# Patient Record
Sex: FEMALE | Race: BLACK OR AFRICAN AMERICAN
[De-identification: names, ages, dates, MRNs, and addresses within clinical notes are randomized per-mention and may not be internally consistent; named-entity substitution may affect disease eponyms.]

---

## 2019-07-12 ENCOUNTER — HOSPITAL ENCOUNTER (EMERGENCY)
Dept: HOSPITAL 25 - ED | Age: 29
Discharge: HOME | End: 2019-07-12
Payer: COMMERCIAL

## 2019-07-12 VITALS — DIASTOLIC BLOOD PRESSURE: 88 MMHG | SYSTOLIC BLOOD PRESSURE: 121 MMHG

## 2019-07-12 DIAGNOSIS — I10: ICD-10-CM

## 2019-07-12 DIAGNOSIS — Z83.3: ICD-10-CM

## 2019-07-12 DIAGNOSIS — E11.9: Primary | ICD-10-CM

## 2019-07-12 LAB
ALBUMIN SERPL BCG-MCNC: 4.7 G/DL (ref 3.2–5.2)
ALBUMIN/GLOB SERPL: 1.2 {RATIO} (ref 1–3)
ALP SERPL-CCNC: 89 U/L (ref 34–104)
ALT SERPL W P-5'-P-CCNC: 14 U/L (ref 7–52)
ANION GAP SERPL CALC-SCNC: 10 MMOL/L (ref 2–11)
AST SERPL-CCNC: 10 U/L (ref 13–39)
BASOPHILS # BLD AUTO: 0.1 10^3/UL (ref 0–0.2)
BUN SERPL-MCNC: 23 MG/DL (ref 6–24)
BUN/CREAT SERPL: 25 (ref 8–20)
CALCIUM SERPL-MCNC: 10.6 MG/DL (ref 8.6–10.3)
CHLORIDE SERPL-SCNC: 96 MMOL/L (ref 101–111)
CK SERPL-CCNC: 53 U/L (ref 10–223)
EOSINOPHIL # BLD AUTO: 0.1 10^3/UL (ref 0–0.6)
GLOBULIN SER CALC-MCNC: 3.9 G/DL (ref 2–4)
GLUCOSE SERPL-MCNC: 433 MG/DL (ref 70–100)
HCO3 SERPL-SCNC: 26 MMOL/L (ref 22–32)
HCT VFR BLD AUTO: 44 % (ref 35–47)
HGB BLD-MCNC: 14.7 G/DL (ref 12–16)
LYMPHOCYTES # BLD AUTO: 2 10^3/UL (ref 1–4.8)
MAGNESIUM SERPL-MCNC: 1.9 MG/DL (ref 1.9–2.7)
MCH RBC QN AUTO: 29 PG (ref 27–31)
MCHC RBC AUTO-ENTMCNC: 34 G/DL (ref 31–36)
MCV RBC AUTO: 87 FL (ref 80–97)
MONOCYTES # BLD AUTO: 0.5 10^3/UL (ref 0–0.8)
NEUTROPHILS # BLD AUTO: 5.2 10^3/UL (ref 1.5–7.7)
NRBC # BLD AUTO: 0 10^3/UL
NRBC BLD QL AUTO: 0.1
PLATELET # BLD AUTO: 318 10^3/UL (ref 150–450)
POTASSIUM SERPL-SCNC: 4.6 MMOL/L (ref 3.5–5)
PROT SERPL-MCNC: 8.6 G/DL (ref 6.4–8.9)
RBC # BLD AUTO: 5.06 10^6 /UL (ref 3.7–4.87)
SODIUM SERPL-SCNC: 132 MMOL/L (ref 135–145)
TSH SERPL-ACNC: 0.89 MCIU/ML (ref 0.34–5.6)
WBC # BLD AUTO: 7.8 10^3/UL (ref 3.5–10.8)

## 2019-07-12 PROCEDURE — 36415 COLL VENOUS BLD VENIPUNCTURE: CPT

## 2019-07-12 PROCEDURE — 80053 COMPREHEN METABOLIC PANEL: CPT

## 2019-07-12 PROCEDURE — 96361 HYDRATE IV INFUSION ADD-ON: CPT

## 2019-07-12 PROCEDURE — 85025 COMPLETE CBC W/AUTO DIFF WBC: CPT

## 2019-07-12 PROCEDURE — 83735 ASSAY OF MAGNESIUM: CPT

## 2019-07-12 PROCEDURE — 86140 C-REACTIVE PROTEIN: CPT

## 2019-07-12 PROCEDURE — 99284 EMERGENCY DEPT VISIT MOD MDM: CPT

## 2019-07-12 PROCEDURE — 96374 THER/PROPH/DIAG INJ IV PUSH: CPT

## 2019-07-12 PROCEDURE — 82803 BLOOD GASES ANY COMBINATION: CPT

## 2019-07-12 PROCEDURE — 83605 ASSAY OF LACTIC ACID: CPT

## 2019-07-12 PROCEDURE — 84443 ASSAY THYROID STIM HORMONE: CPT

## 2019-07-12 PROCEDURE — 82550 ASSAY OF CK (CPK): CPT

## 2019-07-12 NOTE — ED
HPI Diabetic





- HPI Summary


HPI Summary: 


A 28 y/o female accompanied by family presents to [facility] with a chief complaint 

of high blood glucose measured at 430 today. The patient reports new onset DM. 

She says for the past few days she has been thirsty and has an increased 

urinary frequency. She reports a Hx of hyperthyroid and HTN and a FHx of DM on 

her father's side. She also notes that she has been gaining weight. At triage 

she rated her pain as a 0/10 in severity. The patient denies any fevers, chills

, CP or SOB.








- History Of Current Complaint


Chief Complaint: EDDiabeticProb


Time Seen by Provider: 07/12/19 15:12


Hx Obtained From: Patient, Family/Caretaker


Hx Last Menstrual Period: 11/1/16


Onset/Duration: Sudden Onset, Lasting Days, Still Present


Timing: Constant


Severity Initially: Mild


Severity Currently: Mild


Character: Alert


Aggravating: Nothing


Alleviating: Nothing


Associated Signs & Symptoms: Negative - fevers, chills, CP or SOB





- Allergies/Home Medications


Allergies/Adverse Reactions: 


 Allergies











Allergy/AdvReac Type Severity Reaction Status Date / Time


 


No Known Allergies Allergy   Verified 07/12/19 15:09











Home Medications: 


 Home Medications





Atenolol TAB* [Tenormin TAB* 25 MG] 25 mg PO DAILY 07/12/19 [History Confirmed 

07/12/19]


Sertraline* [Zoloft*] 25 - 50 mg PO DAILY 07/12/19 [History Confirmed 07/12/19]











PMH/Surg Hx/FS Hx/Imm Hx


Endocrine/Hematology History: Reports: Hx Diabetes, Hx Thyroid Disease - hyper


Cardiovascular History: Reports: Hx Hypertension - on meds


Respiratory History: 


   Denies: Hx Asthma, Hx Chronic Obstructive Pulmonary Disease (COPD)


GI History: 


   Denies: Hx Ulcer


Infectious Disease History: No


Infectious Disease History: 


   Denies: Hx Hepatitis, Hx Human Immunodeficiency Virus (HIV), Traveled 

Outside the [place] in Last 30 Days





- Family History


Known Family History: Positive: Diabetes - paternal side





- Social History


Alcohol Use: Occasionally


Substance Use Type: Reports: None


Smoking Status (MU): Never Smoked Tobacco





Review of Systems


Positive: Other - positive: thirsty, gaining weight.  Negative: Fever, Chills


Negative: Chest Pain


Negative: Shortness Of Breath


Positive: frequency - increased


All Other Systems Reviewed And Are Negative: Yes





Physical Exam





- Summary


Physical Exam Summary: 





VITAL SIGNS: Reviewed.


GENERAL: Patient is a well-developed and nourished FEMALE who is lying 

comfortable in the stretcher. Patient is not in any acute respiratory distress.


HEAD AND FACE: No signs of trauma. No ecchymosis, hematomas or skull 

depressions. No sinus tenderness.


EYES: PERRLA, EOMI x 2, No injected conjunctiva, no nystagmus.


EARS: Hearing grossly intact. Ear canals and tympanic membranes are within 

normal limits.


MOUTH: Oropharynx within normal limits.


NECK: Supple, trachea is midline, no adenopathy, no JVD, no carotid bruit, no c-

spine tenderness, neck with full ROM.


CHEST: Symmetric, no tenderness at palpation.


LUNGS: Clear to auscultation bilaterally. No wheezing or crackles.


CVS: Regular rate and rhythm, S1 and S2 present, no murmurs or gallops 

appreciated.


ABDOMEN: Soft, non-tender. No signs of distention. No rebound, no guarding, and 

no masses palpated. Bowel sounds are normal.


EXTREMITIES: FROM in all major joints, no edema, no cyanosis or clubbing.


NEURO: Alert and oriented x 3. No acute neurological deficits. Speech is normal 

and follows commands.


SKIN: Dry and warm.


Triage Information Reviewed: Yes


Vital Signs On Initial Exam: 


 Initial Vitals











Temp Pulse Resp BP Pulse Ox


 


 97.4 F   78   16   130/94   99 


 


 07/12/19 15:06  07/12/19 15:06  07/12/19 15:06  07/12/19 15:06  07/12/19 15:06











Vital Signs Reviewed: Yes





Diagnostics





- Vital Signs


 Vital Signs











  Temp Pulse Resp BP Pulse Ox


 


 07/12/19 15:06  97.4 F  78  16  130/94  99














- Laboratory


Lab Results: 


 Lab Results











  07/12/19 07/12/19 07/12/19 Range/Units





  15:47 15:47 15:47 


 


WBC  7.8    (3.5-10.8)  10^3/uL


 


RBC  5.06 H    (3.70-4.87)  10^6 /uL


 


Hgb  14.7    (12.0-16.0)  g/dL


 


Hct  44    (35-47)  %


 


MCV  87    (80-97)  fL


 


MCH  29    (27-31)  pg


 


MCHC  34    (31-36)  g/dL


 


RDW  14    (10-15)  %


 


Plt Count  318    (150-450)  10^3/uL


 


MPV  8.0    (7.4-10.4)  fL


 


Neut % (Auto)  66.5    %


 


Lymph % (Auto)  25.8    %


 


Mono % (Auto)  5.9    %


 


Eos % (Auto)  0.9    %


 


Baso % (Auto)  0.9    %


 


Absolute Neuts (auto)  5.2    (1.5-7.7)  10^3/ul


 


Absolute Lymphs (auto)  2.0    (1.0-4.8)  10^3/ul


 


Absolute Monos (auto)  0.5    (0-0.8)  10^3/ul


 


Absolute Eos (auto)  0.1    (0-0.6)  10^3/ul


 


Absolute Basos (auto)  0.1    (0-0.2)  10^3/ul


 


Absolute Nucleated RBC  0.0    10^3/ul


 


Nucleated RBC %  0.1    


 


VBG pH     (7.32-7.43)  


 


VBG pCO2     (41-51)  mmHg


 


VBG pO2     (35-45)  mmHg


 


VBG HCO3     (24-28)  mmol/L


 


VBG O2 Saturation     (70-80)  %


 


VBG Base Excess     (0.0-4.0)  mmol/L


 


Sodium   132 L   (135-145)  mmol/L


 


Potassium   4.6   (3.5-5.0)  mmol/L


 


Chloride   96 L   (101-111)  mmol/L


 


Carbon Dioxide   26   (22-32)  mmol/L


 


Anion Gap   10   (2-11)  mmol/L


 


BUN   23   (6-24)  mg/dL


 


Creatinine   0.92   (0.51-0.95)  mg/dL


 


Est GFR ( Amer)   86.2   (>60)  


 


Est GFR (Non-Af Amer)   71.2   (>60)  


 


BUN/Creatinine Ratio   25.0 H   (8-20)  


 


Glucose   433 H   ()  mg/dL


 


Lactic Acid    0.8  (0.5-2.0)  mmol/L


 


Calcium   10.6 H   (8.6-10.3)  mg/dL


 


Magnesium   1.9   (1.9-2.7)  mg/dL


 


Total Bilirubin   0.40   (0.2-1.0)  mg/dL


 


AST   10 L   (13-39)  U/L


 


ALT   14   (7-52)  U/L


 


Alkaline Phosphatase   89   ()  U/L


 


Total Creatine Kinase   53   ()  U/L


 


C-Reactive Protein   4.64   (<8.01)  mg/L


 


Total Protein   8.6   (6.4-8.9)  g/dL


 


Albumin   4.7   (3.2-5.2)  g/dL


 


Globulin   3.9   (2-4)  g/dL


 


Albumin/Globulin Ratio   1.2   (1-3)  














  07/12/19 Range/Units





  15:47 


 


WBC   (3.5-10.8)  10^3/uL


 


RBC   (3.70-4.87)  10^6 /uL


 


Hgb   (12.0-16.0)  g/dL


 


Hct   (35-47)  %


 


MCV   (80-97)  fL


 


MCH   (27-31)  pg


 


MCHC   (31-36)  g/dL


 


RDW   (10-15)  %


 


Plt Count   (150-450)  10^3/uL


 


MPV   (7.4-10.4)  fL


 


Neut % (Auto)   %


 


Lymph % (Auto)   %


 


Mono % (Auto)   %


 


Eos % (Auto)   %


 


Baso % (Auto)   %


 


Absolute Neuts (auto)   (1.5-7.7)  10^3/ul


 


Absolute Lymphs (auto)   (1.0-4.8)  10^3/ul


 


Absolute Monos (auto)   (0-0.8)  10^3/ul


 


Absolute Eos (auto)   (0-0.6)  10^3/ul


 


Absolute Basos (auto)   (0-0.2)  10^3/ul


 


Absolute Nucleated RBC   10^3/ul


 


Nucleated RBC %   


 


VBG pH  7.39  (7.32-7.43)  


 


VBG pCO2  49  (41-51)  mmHg


 


VBG pO2  16.0 L  (35-45)  mmHg


 


VBG HCO3  25.9  (24-28)  mmol/L


 


VBG O2 Saturation  < 38.0 L  (70-80)  %


 


VBG Base Excess  3.7  (0.0-4.0)  mmol/L


 


Sodium   (135-145)  mmol/L


 


Potassium   (3.5-5.0)  mmol/L


 


Chloride   (101-111)  mmol/L


 


Carbon Dioxide   (22-32)  mmol/L


 


Anion Gap   (2-11)  mmol/L


 


BUN   (6-24)  mg/dL


 


Creatinine   (0.51-0.95)  mg/dL


 


Est GFR ( Amer)   (>60)  


 


Est GFR (Non-Af Amer)   (>60)  


 


BUN/Creatinine Ratio   (8-20)  


 


Glucose   ()  mg/dL


 


Lactic Acid   (0.5-2.0)  mmol/L


 


Calcium   (8.6-10.3)  mg/dL


 


Magnesium   (1.9-2.7)  mg/dL


 


Total Bilirubin   (0.2-1.0)  mg/dL


 


AST   (13-39)  U/L


 


ALT   (7-52)  U/L


 


Alkaline Phosphatase   ()  U/L


 


Total Creatine Kinase   ()  U/L


 


C-Reactive Protein   (<8.01)  mg/L


 


Total Protein   (6.4-8.9)  g/dL


 


Albumin   (3.2-5.2)  g/dL


 


Globulin   (2-4)  g/dL


 


Albumin/Globulin Ratio   (1-3)  











Result Diagrams: 


 07/12/19 15:47





 07/12/19 15:47


Lab Statement: Any lab studies that have been ordered have been reviewed, and 

results considered in the medical decision making process.





Diabetic Course/Dx





- Course


Assessment/Plan: A 28 y/o female accompanied by family presents to [facility] with a 

chief complaint of high blood glucose measured at 430 today. The patient 

reports new onset DM. She says for the past few days she has been thirsty and 

has an increased urinary frequency. She reports a Hx of hyperthyroid and HTN 

and a FHx of DM on her father's side. She also notes that she has been gaining 

weight. At triage she rated her pain as a 0/10 in severity. The patient denies 

any fevers, chills, CP or SOB.  Blood work without any significant abnormality 

except for side of 132, crying 96, glucose 433, consistent 10.6, AST is 10.  

VBG has a pH of 7.39 which is normal.  Patient was given 2 L of IV fluids and 

insulin.  Finger stick is 164.  Therefore the patient will be discharged home 

with follow-up with PCP.  Patient was given a prescription for metformin, 

glucometer, lancets and strips.  Patient is hemodynamically stable.





- Diagnoses


Provider Diagnoses: 


 Diabetes mellitus, new onset, New onset type 2 diabetes mellitus








Discharge





- Sign-Out/Discharge


Documenting (check all that apply): Patient Departure - DC


Patient Received Moderate/Deep Sedation with Procedure: No





- Discharge Plan


Condition: Stable


Disposition: HOME


Prescriptions: 


Blood-Glucose Meter [Freestyle Lite Meter] 1 each MC DAILY #1 kit


Lancets [Freestyle Lancets] 1 each MC DAILY #30 each


Lancets/Blood Glucose Strips [Fora [id number] Strp-Lnct] 1 kit XX DAILY #1 

kit


metFORMIN* [Glucophage 500 MG TAB *] 500 mg PO BID #30 tab


Patient Education Materials:  Type 2 Diabetes in Adults: New Diagnosis (ED)


Referrals: 


Dylon Garcia MD [Primary Care Provider] -  (2-3 days)


Additional Instructions: 


FOLLOW UP WITH YOUR PRIMARY CARE PROVIDER WITHIN 2-3 DAYS.


RETURN TO THE ED FOR ANY WORSENING OR NEW SYMPTOMS.





- Billing Disposition and Condition


Condition: STABLE


Disposition: Home





- Attestation Statements


Document Initiated by Chrissieibe: Yes


Documenting Scribe: Zeke Melendrez


Provider For Whom Scribe is Documenting (Include Credential): Oscar Roman MD


Scribe Attestation: 


Zeke GARCIA scribed for Oscar Roman MD on 07/13/19 at 1050. 


Scribe Documentation Reviewed: Yes


Provider Attestation: 


The documentation as recorded by the Zeke crews accurately 

reflects the service I personally performed and the decisions made by Oscar randle MD


Status of Scribe Document: Viewed

## 2019-07-12 NOTE — XMS REPORT
Continuity of Care Document (CCD)

 Created on:2019



Patient:Jana Carlos

Sex:Female

:1990

External Reference #:[id number]





Demographics







 Address  [address]



   [place] [address]

 

 Home Phone  [phone]

 

 Mobile Phone  [phone]

 

 Work Phone  [phone]

 

 Preferred Language  en

 

 Marital Status  Not  or 

 

 Uatsdin Affiliation  Unknown

 

 Race  Black / 

 

 Ethnic Group  Not  or 









Author







 Name  Kristen Norris NP

 

 Address  [address]



   Unavailable



   [place] [address]









Care Team Providers







 Name  Role  Phone

 

 Dylon Garcia  Care Team Information   Unavailable

 

 Dylon Garcia  Primary Care Physician  Unavailable









Payers







 Date  Identification Numbers  Payment Provider  Subscriber

 

   Policy Number: [id number]  [employer] Ryan Carlos









 Group Number: [id number]  [id number]

 

 Group Name: [place] [address]









 PayID: [id number]







Problems







 Active Problems  Provider  Date

 

 Adjustment disorder with depressed mood  Dylon Garcia M.D.  Onset: 2017

 

 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  Onset: 2012

 

 Goiter  Dylon Garcia M.D.  Onset: 2012

 

 Tachycardia  Dylon Garcia M.D.  Onset: 2012

 

 Benign essential hypertension  Dylon Garcia M.D.  Onset: 2012







Family History







 Date  Family Member(s)  Observation  Comments

 

   General  Family members all A+W  

 

   Mother  Depression  







Social History







 Type  Date  Description  Comments

 

 Birth Sex    Unknown  

 

 Education    Highest level of education  



     completed is 12th grade  

 

 Living Situation    Patient lives alone  

 

 Diet    Diet is healthy and well  



     balanced  

 

 Sleep    Reports difficulty falling  



     asleep  

 

 Pets    There are no pets in the home  

 

 Occupation    [employer]

 

 Tobacco Use  Start: Unknown  Never Smoked Cigarettes  

 

 ETOH Use    Denies alcohol use  

 

 Tobacco Use  Start: Unknown  Patient has never smoked  

 

 Exercise Type/Frequency    Walks daily  



 Current      







Allergies, Adverse Reactions, Alerts







 Description

 

 No Known Drug Allergies







Medications







 Active Medications  SIG  Qnty  Indications  Ordering  Date



         Provider  

 

 Sertraline HCL  Take 1 To 2 Tablets  60tabs  F43.21  Dylon Garcia,  2017



               25mg  Every Morning For      M.D.  



 Tablets  Anxiety And        



   Depression        

 

 Atenolol  take 1 tablet by  30tabs  I10  Dylon Garcia,  2010



         25mg Tablets  mouth every day      M.D.  



           









 History Medications









 Methimazole  1 po tid Or as  60tabs    Dylon ROB  2012 -



      10mg Tablets  Directed      MICHEL Garcia  2014



           

 

 Medrol Dosepak  use  as  1tabs  782.1  Janet Kilpatrick,  2011 -



         4mg Tablets  directed      [facility]  07/15/2011



           

 

 Note  Due To Health Issues  Huong was    782.1  Janet Kilpatrick,  2011 -



   seen by me      [facility]  07/15/2011



   today and may        



   not return to        



   work until        



   Thursday,        



   3\10\11        

 

 Multi-Vitamin  1 po qd      Unknown   -



         Tablets          2010



           

 

 Ortho  Patch      Unknown   -



           2010

 

 Medroxyprogesterone  1 im q 3 months      Unknown   -



 Acetate          2011



  150mg/ml Suspension          



           

 

 Depo-Provera Contraceptive  q 3 months      Unknown   -



           2014



 150mg/ml Suspension          



           







Medications Administered in Office







 Medication  SIG  Qnty  Indications  Ordering Provider  Date

 

 Brief Emotional/Behav Assessment        Dylon Garcia M.D.  2017



 W/ Scoring Doc Per Standard Inst          



                      Injection          



           







Vital Signs







 Date  Vital  Result  Comment

 

 2019  2:03pm  BP Systolic  108 mmHg  









 BP Diastolic  80 mmHg  

 

 Heart Rate  82 /min  

 

 Body Temperature  97.5 F  

 

 Respiratory Rate  16 /min  

 

 Height  60 inches  5'0"

 

 Weight  155.00 lb  

 

 BMI (Body Mass Index)  30.3 kg/m2  









 2019  2:26pm  BP Systolic  112 mmHg  









 BP Diastolic  70 mmHg  

 

 Heart Rate  76 /min  

 

 Body Temperature  98.7 F  

 

 Respiratory Rate  16 /min  

 

 Height  60 inches  5'0"

 

 Weight  143.00 lb  

 

 BMI (Body Mass Index)  27.9 kg/m2  









 2017  8:57am  BP Systolic  116 mmHg  









 BP Diastolic  84 mmHg  

 

 Heart Rate  78 /min  

 

 Body Temperature  98.6 F  

 

 Respiratory Rate  16 /min  

 

 Height  60 inches  5'0"

 

 Weight  136.00 lb  

 

 BMI (Body Mass Index)  26.6 kg/m2  









 2017  9:27am  BP Systolic  116 mmHg  









 BP Diastolic  70 mmHg  

 

 Heart Rate  72 /min  

 

 Body Temperature  98.8 F  

 

 Respiratory Rate  16 /min  

 

 Height  60 inches  5'0"

 

 Weight  128.12 lb  

 

 BMI (Body Mass Index)  25.0 kg/m2  









 2017  1:48pm  BP Systolic  128 mmHg  









 BP Diastolic  82 mmHg  

 

 Heart Rate  76 /min  

 

 Body Temperature  98.4 F  

 

 Respiratory Rate  16 /min  

 

 Height  60 inches  5'0"

 

 Weight  133.50 lb  

 

 BMI (Body Mass Index)  26.1 kg/m2  









 2017  1:46pm  BP Systolic  144 mmHg  









 BP Diastolic  72 mmHg  

 

 Heart Rate  84 /min  

 

 Body Temperature  99.1 F  

 

 Respiratory Rate  16 /min  

 

 Weight  135.38 lb  









 2016 12:45pm  BP Systolic  116 mmHg  









 BP Diastolic  60 mmHg  

 

 Heart Rate  66 /min  

 

 Body Temperature  98.4 F  

 

 Respiratory Rate  16 /min  

 

 Weight  137.00 lb  









 2016  4:20pm  BP Systolic  126 mmHg  









 BP Diastolic  74 mmHg  

 

 Heart Rate  84 /min  

 

 Body Temperature  98.6 F  

 

 Respiratory Rate  16 /min  

 

 Weight  137.00 lb  









 2015  4:17pm  BP Systolic  126 mmHg  









 BP Diastolic  80 mmHg  

 

 Heart Rate  72 /min  

 

 Body Temperature  98.4 F  

 

 Respiratory Rate  16 /min  

 

 Weight  138.38 lb  









 2015 12:58pm  BP Systolic  110 mmHg  









 BP Diastolic  60 mmHg  

 

 Heart Rate  74 /min  

 

 Body Temperature  98.7 F  

 

 Respiratory Rate  14 /min  

 

 Height  60 inches  5'0"

 

 Weight  137.00 lb  

 

 BMI (Body Mass Index)  26.8 kg/m2  









 2014 12:52pm  BP Systolic  124 mmHg  









 BP Diastolic  86 mmHg  

 

 Heart Rate  64 /min  

 

 Body Temperature  98.2 F  

 

 Respiratory Rate  16 /min  

 

 Height  60 inches  5'0"

 

 Weight  132.00 lb  

 

 BMI (Body Mass Index)  25.8 kg/m2  









 2014  5:01pm  BP Systolic  130 mmHg  









 BP Diastolic  80 mmHg  

 

 Heart Rate  68 /min  

 

 Body Temperature  98.0 F  

 

 Respiratory Rate  18 /min  

 

 Height  60 inches  5'0"

 

 Weight  136.00 lb  

 

 BMI (Body Mass Index)  26.6 kg/m2  









 2012  2:02pm  BP Systolic  114 mmHg  









 BP Diastolic  76 mmHg  

 

 Heart Rate  90 /min  

 

 Body Temperature  98.3 F  

 

 Height  60 inches  5'0"

 

 Weight  125.00 lb  

 

 BMI (Body Mass Index)  24.4 kg/m2  









 2012  2:06pm  BP Systolic  132 mmHg  









 BP Diastolic  82 mmHg  

 

 Heart Rate  80 /min  

 

 Respiratory Rate  14 /min  

 

 Height  60 inches  5'0"

 

 Weight  127.00 lb  

 

 BMI (Body Mass Index)  24.8 kg/m2  









 2012  3:34pm  BP Systolic  158 mmHg  









 BP Diastolic  90 mmHg  

 

 Heart Rate  104 /min  

 

 Height  60 inches  5'0"

 

 Weight  127.00 lb  

 

 BMI (Body Mass Index)  24.8 kg/m2  









 07/15/2011 11:31am  BP Systolic  110 mmHg  









 BP Diastolic  74 mmHg  

 

 Heart Rate  88 /min  

 

 Body Temperature  99.0 F  

 

 Height  60 inches  5'0"

 

 Weight  131.00 lb  

 

 BMI (Body Mass Index)  25.6 kg/m2  









 2011 11:00am  BP Systolic  100 mmHg  









 BP Diastolic  70 mmHg  

 

 Heart Rate  88 /min  

 

 Body Temperature  99.0 F  

 

 Height  60 inches  5'0"

 

 Weight  128.00 lb  

 

 BMI (Body Mass Index)  25.0 kg/m2  









 2010  1:59pm  BP Systolic  128 mmHg  









 BP Diastolic  76 mmHg  

 

 Heart Rate  78 /min  

 

 Body Temperature  99.2 F  

 

 Height  60 inches  5'0"

 

 Weight  127.00 lb  

 

 BMI (Body Mass Index)  24.8 kg/m2  









 2010  2:07pm  BP Systolic  124 mmHg  









 BP Diastolic  80 mmHg  

 

 Heart Rate  78 /min  

 

 Body Temperature  99.5 F  

 

 Height  60 inches  5'0"

 

 Weight  123.00 lb  

 

 BMI (Body Mass Index)  24.0 kg/m2  









 2010  1:20pm  BP Systolic  132 mmHg  









 BP Diastolic  90 mmHg  

 

 Heart Rate  84 /min  

 

 Body Temperature  99.0 F  

 

 Respiratory Rate  16 /min  

 

 Height  60 inches  5'0"

 

 Weight  126.00 lb  

 

 BMI (Body Mass Index)  24.6 kg/m2  









 2010  2:23pm  BP Systolic  140 mmHg  









 BP Diastolic  98 mmHg  

 

 Heart Rate  106 /min  

 

 Body Temperature  98.2 F  

 

 Height  60 inches  5'0"

 

 Weight  122.00 lb  

 

 BMI (Body Mass Index)  23.8 kg/m2  









 2009  9:35am  BP Systolic  136 mmHg  









 BP Diastolic  90 mmHg  

 

 Heart Rate  80 /min  

 

 Respiratory Rate  18 /min  

 

 Height  60 inches  5'0"

 

 Weight  123.00 lb  

 

 BMI (Body Mass Index)  24.0 kg/m2  







Results







 Test  Date  Facility  Test  Result  H/L  Range  Note

 

 Ua - Non Micro ([facility])  2019  [facility]  Appearance  clear      



     (607)-   -          









 Color  yellow      

 

 Glucose, Urine ([facility])  >=1000  #  provider aware  

 

 Bilirubin  negative      

 

 Ketones  trace  #    

 

 SP Grav  1.010      

 

 Blood  negative      

 

 PH  5.5      

 

 Protein  negative      

 

 Urobil  0.2      

 

 Nitrite  negative      

 

 Leukocytes ([facility])  negative      









 Laboratory test  2019  [facility]  Glucose  >600 mg/dL  High  70-
105  



 finding    (607)-   -  Fingerstick (a)        

 

 Laboratory test  2019  [facility](a)  TSH  0.70 mIU/L    0.50-6.00
  



 finding              









 Free T4  0.93 ng/dL    0.75-1.54  









 Basic Metabolic Profile  2019  [facility](a)  Sodium  137 mEq/L    
134-149  









 Potassium  4.8 mEq/L    3.6-5.5  

 

 Chloride  109 mEq/L      

 

 Carbon Dioxide  24 mEq/L    21-32  

 

 Glucose  95 mg/dL      

 

 BUN  13 mg/dL    6-26  

 

 Creatinine  0.7 mg/dL    0.6-1.4  

 

 BUN/Creat Ratio  18.6 CALC    8.0-36.0  

 

 Calcium  9.2 mg/dL    8.6-10.2  

 

 GFR Non-African American  >60 ml/min/1.73m^    >=60  

 

 GFR African American  >60 ml/min/1.73m^    >=60  









 Laboratory test finding  2017  [facility])  TSH  1.07 mIU/L    
0.50-6.00  









 Free T4  0.93 ng/dL    0.75-1.54  









 Comprehensive Metabolic  2017  [facility])  Sodium  139 mEq/L    
134-149  



 Prof              









 Potassium  4.4 mEq/L    3.6-5.5  

 

 Chloride  108 mEq/L      

 

 Carbon Dioxide  24 mEq/L    21-32  

 

 Glucose  120 mg/dL  High    

 

 BUN  9 mg/dL    6-26  

 

 Creatinine  0.7 mg/dL    0.6-1.4  

 

 BUN/Creat Ratio  12.9 CALC    8.0-36.0  

 

 Calcium  9.3 mg/dL    8.6-10.2  

 

 Total Protein  7.7 g/dL    6.4-8.3  

 

 Albumin  4.7 g/dL    3.8-5.5  

 

 Globulin  3.0 g/dL    2.0-4.8  

 

 A/G Ratio  1.6 CALC    0.6-2.3  

 

 Alk. Phosphatase  48 U/L      

 

 Alt (SGPT)  11 U/L    7-35  

 

 Ast (Sgot)  14 U/L    5-34  

 

 Total Bilirubin  0.3 mg/dL    0.2-1.3  

 

 GFR Non-African American  >60 ml/min/1.73m^    >=60  

 

 GFR African American  >60 ml/min/1.73m^    >=60  









 Complete Blood Count  2017  [facility](a)  WBC  4.0 x10^3/UL    3.6
-9.6  









 RBC  4.54 x10^6/UL    3.90-5.70  

 

 HGB  14.3 g/dL    12.1-17.2  

 

 HCT  42 %    36-50  

 

 MCV  92.0 fL    82.2-97.4  

 

 MCH  31.4 pg    27.6-33.3  

 

 MCHC  34.3 g/dL    33.0-35.5  

 

 RDW  13.0 %    11.6-13.7  

 

 PLT  295 x10^3/UL    150-400  

 

 MPV  7.0 fL  Low  7.4-10.4  

 

 Gran #  2.2 x10^3/UL    1.5-7.2  

 

 Lymph#  1.6 x10^3/UL    0.7-4.9  

 

 Mono#  0.2 x10^3/UL    0.1-0.9  

 

 Gran %  55.1 %    42.2-75.2  

 

 Lymph %  39.6 %    20.5-51.1  

 

 Mono%  5.3 %    1.7-9.3  









 Laboratory test finding  2017  [facility](a)  TSH  0.48 mIU/L  Low
  0.50-6.00  1









 Free T4  1.12 ng/dL    0.75-1.54  









 Laboratory test finding  2016  [facility])  TSH  0.41 mIU/L  Low
  0.50-6.00  2









 Free T4  0.99 ng/dL    0.75-1.54  









 Laboratory test  2016  [facility](a)  Free T4  1.03 ng/dL    0.75-
1.54  



 finding              









 TSH  0.39 mIU/L  Low  0.50-6.00  3









 Laboratory test  2015  [facility](a)  Free T4  1.19 ng/dL    0.75-
1.54  



 finding              









 TSH  0.41 mIU/L  Low  0.50-6.00  4









 Laboratory test  2015  [facility](a)  Free T4  1.11 ng/dL    0.75-
1.54  



 finding              









 TSH  0.92 mIU/L    0.50-6.00  









 Basic Metabolic Profile  2015  [facility](a)  Sodium  134 mEq/L    
134-149  









 Potassium  4.6 mEq/L    3.6-5.5  

 

 Chloride  101 mEq/L      

 

 Carbon Dioxide  25 mEq/L    21-32  

 

 Glucose  102 mg/dL      

 

 BUN  11 mg/dL    6-26  

 

 Creatinine  0.8 mg/dL    0.6-1.4  

 

 BUN/Creat Ratio  13.8 CALC    8.0-36.0  

 

 Calcium  9.6 mg/dL    8.6-10.2  









 Laboratory test finding  2014  [facility](a)  TSH  0.50 mIU/L    
0.50-6.00  









 Free T4  1.10 ng/dL    0.75-1.54  

 

 Free T3  2.53 pg/mL    2.00-4.90  









 Laboratory test finding  2012  [facility]  Thyroxine Free  1.50 ng/dL  High  
0.61-1.24  









 T3 Total  2.46 NG/ML  High  0.5-1.7  

 

 T3 Free  5.38 pg/mL    2.39-6.79  

 

 TSH  0.04 MIU/ML  Low  0.34-5.60  









 Laboratory test finding  2012  [facility]  Thyroxine Free  3.19 ng/dL  High  
0.61-1.24  









 T3 Total  3.94 NG/ML  High  0.5-1.7  

 

 T3 Free  10.39 pg/mL  High  2.39-6.79  

 

 TSH  0.03 MIU/ML  Low  0.34-5.60  

 

 Thyroperoxidase Antibody  716.1 IU/mL  High  Less Than 9.0  

 

 Thyroid Stimulating Igg  5.1  Abnormal  <=1.3  5









 Basic Metabolic Profile  2012  [facility](a)  BUN  17 mg/dL    6-
26  









 Calcium  9.7 mg/dL    8.6-10.2  

 

 Chloride  103 mEq/L      

 

 Creatinine  0.6 mg/dL    0.6-1.4  

 

 Carbon Dioxide  24 mEq/L    21-32  

 

 Glucose  122 mg/dL  High    6

 

 Sodium  139 mEq/L    134-149  

 

 Potassium  4.0 mEq/L    3.6-5.5  

 

 BUN/Creat Ratio  30.6 Calc    8.0-36.0  









 Laboratory test  2012  [facility])  Free T4  > 9.44 ng/dL  High  
0.75-1.54  



 finding              









 TSH  < 0.01 mIU/L  Low  0.50-6.00  









 CBC Electronic ([facility])  2012  [facility]  WBC  5.7    3.6-9.6  



     (607)-   -          









 RBC  4.53    3.90-5.70  

 

 Hemoglobin ([facility])  12.5 g/dL    12.1 - 17.2  

 

 Hematocrit ([facility])  37.1 %    36.1 - 50.3  

 

 Platelets  279 10^3/ul    150-400  

 

 Lymph%  45.0    20.5-51.1  

 

 Mixed%  8.4      

 

 Neutrophils %  46.6      

 

 Mean Corpuscular Vol  81.9  Low  82.2-97.4  

 

 Mean Corpuscular Hemoglobin  27.6    27.6-33.3  

 

 Mean Corpuscular Hemo Concen  33.7    32.0-36.0  

 

 RDW  12.7    11.6-13.7  

 

 Mean Platelet Volume  11.0    6.5-11.0  









 Comprehensive Metabolic  2010  [facility])  Albumin  4.8 g/dL    
3.8-5.5  



 Prof              









 Alk. Phos.  59 U/L      

 

 Alt (SGPT)  33 U/L    7-35  

 

 Ast (Sgot)  20 U/L    5-34  

 

 BUN  11 mg/dL    6-26  

 

 Calcium  10.1 mg/dL    8.6-10.2  

 

 Chloride  101 mEq/L      

 

 Creatinine  0.7 mg/dL    0.6-1.4  

 

 Carbon Dioxide  23 mEq/L    21-32  

 

 Glucose  88 mg/dL      

 

 Sodium  140 mEq/L    134-149  

 

 Total Bilirubin  0.5 mg/dL    0.2-1.3  

 

 Total Protein  8.1 g/dL    6.3-8.1  

 

 Potassium  4.0 mEq/L    3.6-5.5  

 

 Globulin  3.3 g/dL    2.0-4.8  

 

 A/G Ratio  1.5 Calc    0.6-2.2  

 

 BUN/Creat Ratio  15.1 Calc    8.0-36.0  









 Laboratory test finding  2010  [facility](a)  TSH  1.23 mIU/L    
0.50-6.00  









 Free T4  0.96 ng/dL    0.75-1.54  









 CBC ([facility])  2010  Family Medicine  WBC  5.5    3.6-9.6  



     (607)-   -          









 RBC  4.78    3.90-5.70  

 

 Hemoglobin ([facility])  14.7 g/dL    12.1 - 17.2  

 

 Hematocrit ([facility])  42.5 %    36.1 - 50.3  

 

 Mean Corpuscular Vol  88.9    82.2-97.4  

 

 Mean Corpuscular Hemaglobin  30.8    27.6-33.3  

 

 Mean Corpuscular Hemo Concen  34.6    33.0-36.0  

 

 Platelets  292 10^3/ul    150-400  

 

 Lymph%  32.1    20.5-51.1  

 

 Mixed%  6.7      

 

 Neutrophils %  =61.2      

 

 RDW  13.9  High  11.6-13.7  

 

 Mean Platelet Volume  9.4    7.4-10.4  









 Laboratory test finding  2009  [facility](a)  TSH  0.87 mIU/L    
0.50-6.00  









 Free T4  1.27 ng/dL    0.75-1.54  

 

 Free T3  3.09 pg/mL    2.00-4.90  









 1  RESULTS VERIFIED BY REPEAT ANALYSIS

 

 2  RESULTS VERIFIED BY REPEAT ANALYSIS

 

 3  RESULTS VERIFIED BY REPEAT ANALYSIS

 

 4  RESULTS VERIFIED BY REPEAT ANALYSIS

 

 5  INFCE Result Units: TSI index



   



   Test Performed by:



   [facility]



   [address] [place] [address]



   : Aneudy Montiel III, M.D.

 

 6  RESULT LEANNA'D







Procedures







 Date  Code  Description  Status

 

 2017  83859  Brief Emotional/Behav Assessment W/ Scoring Doc Per  
Completed



     Standard Inst  

 

 2012  54384  Pulse Oximetry  Completed

 

 2012  30945  Electrocardiogram Complete  Completed







Encounters







 Type  Date  Location  Provider  Dx  Diagnosis

 

 Office Visit  2019  Main Office  Dylon Garcia,  I10  Essential (primary
)



   2:20p    M.D.    hypertension









 F43.21  Adjustment disorder with depressed mood

 

 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









 Office Visit  2017  9:00a  Main Office  Dylon Garcia,  I10  Essential (
primary)



       M.D.    hypertension









 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

 

 F43.21  Adjustment disorder with depressed mood









 Office Visit  2017  9:40a  Main Office  Dylon Garcia,  F43.21  
Adjustment disorder



       M.D.    with depressed mood









 I10  Essential (primary) hypertension









 Office Visit  2017  2:00p  Main Office  Dylon Garcia,  F43.21  
Adjustment disorder



       M.D.    with depressed mood









 I10  Essential (primary) hypertension

 

 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









 Office Visit  2017  2:00p  Main Office  Dylon Garcia,  I10  Essential (
primary)



       M.D.    hypertension









 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

 

 F43.21  Adjustment disorder with depressed mood

 

 Z13.9  Encounter for screening, unspecified









 Office Visit  2016  1:00p  Main Office  Dylon Garcia,  I10  Essential (
primary)



       M.D.    hypertension









 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









 Office Visit  2016  4:20p  Main Office  Dylon Garcia,  I10  Essential (
primary)



       M.D.    hypertension









 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









 Office Visit  2015  4:20p  Main Office  Dylon Garcia,  401.1  
Hypertension Benign



       M.D.    









 242.00  Goiter Toxic Diffuse W/O Thyrotoxic Crisis Or Storm









 Office Visit  2015  1:00p  Main Office  Dylon Garcia,  401.1  
Hypertension Benign



       M.D.    









 242.00  Goiter Toxic Diffuse W/O Thyrotoxic Crisis Or Storm









 Office Visit  2014  1:00p  Main Office  Dylon Garcia,  242.00  Goiter 
Toxic



       M.D.    Diffuse W/O



           Thyrotoxic Crisis



           Or Storm









 401.1  Hypertension Benign









 Office Visit  2014  4:00p  Main Office  Janet Kilpatrick,  401.1  
Hypertension Benign



       FNP    

 

 Office Visit  2013  9:00a  Main Office  Neil MURDOCK  401.1  Hypertension 
Benign



       MICHEL Clement    

 

 Office Visit  2012  2:10p  Main Office  Dylon Garcia,  242.00  Goiter 
Toxic



       M.D.    Diffuse W/O



           Thyrotoxic Crisis



           Or Storm









 401.1  Hypertension Benign

 

 785.0  Tachycardia Unspec









 Office Visit  2012  2:10p  Main Office  Dylon Garcia,  242.00  Goiter 
Toxic



       M.D.    Diffuse W/O



           Thyrotoxic Crisis



           Or Storm









 401.1  Hypertension Benign

 

 785.0  Tachycardia Unspec









 Office Visit  2012  4:00p  Main Office  Dylon Garcia,  401.1  
Hypertension Benign



       M.D.    









 785.0  Tachycardia Unspec

 

 240.9  Goiter Unspec









 Office Visit  07/15/2011 11:40a  Main Office  Dylon Garcia,  401.1  
Hypertension Benign



       M.D.    

 

 Office Visit  2011 11:15a  Main Office  Janet Kilpatrick,  782.1  Rash & 
Other Nonspec



       FNP    Skin Eruption

 

 Office Visit  2010  1:50p  Main Office  Dylon Garcia,  401.1  
Hypertension Benign



       M.D.    

 

 Office Visit  2010  2:00p  Main Office  Dylon Garcia,  401.1  
Hypertension Benign



       M.D.    









 785.0  Tachycardia Unspec









 Office Visit  2010  1:20p  Main Office  Dylon Garcia,  401.1  
Hypertension Benign



       M.D.    

 

 Office Visit  2010  2:10p  Main Office  Dylon Garcia,  401.1  
Hypertension Benign



       M.D.    









 785.0  Tachycardia Unspec









 Office Visit  2009  9:30a  Main Office  Janet Kilpatrick FNP  240.9  Goiter 
Unspec







Plan of Treatment

Future Appointment(s):2019  9:20 am - Dylon Garcia M.D. at Main 
[id number] - Kristen Norris, NPR73.9 Hyperglycemia, unspecifiedComments
:Please go to the ED right now for stabilization. Come back in 3 days for a 
recheck with an MD.R63.1 PolydipsiaAllComments:1.  Patient has been queried 
about patient's goals/preferences and functional/lifestyle goals at relevant 
visits.  If relevant, describe: Has been discussed, noted above2.  Treatment 
goals as explainedto the patient: see above3.  Are there barriers to meeting 
treatment goals?  Yes    If Yes, please describe: Barriers include possible 
insurance limits, disease process, and difficulty with lifestyle changes4.  Self
-Management goals as described to the patient: Yes, see above As always, we 
strongly encourage a healthy diet and making physical activity a part of your 
every day life. If you have questions about how or where to start, please 
contact the office.